# Patient Record
Sex: FEMALE | Race: WHITE | NOT HISPANIC OR LATINO | URBAN - METROPOLITAN AREA
[De-identification: names, ages, dates, MRNs, and addresses within clinical notes are randomized per-mention and may not be internally consistent; named-entity substitution may affect disease eponyms.]

---

## 2022-04-21 ENCOUNTER — OFFICE VISIT (OUTPATIENT)
Dept: URBAN - METROPOLITAN AREA CLINIC 72 | Facility: CLINIC | Age: 67
End: 2022-04-21
Payer: MEDICARE

## 2022-04-21 ENCOUNTER — LAB OUTSIDE AN ENCOUNTER (OUTPATIENT)
Dept: URBAN - METROPOLITAN AREA CLINIC 72 | Facility: CLINIC | Age: 67
End: 2022-04-21

## 2022-04-21 ENCOUNTER — WEB ENCOUNTER (OUTPATIENT)
Dept: URBAN - METROPOLITAN AREA CLINIC 72 | Facility: CLINIC | Age: 67
End: 2022-04-21

## 2022-04-21 VITALS
HEIGHT: 68 IN | WEIGHT: 137.8 LBS | DIASTOLIC BLOOD PRESSURE: 70 MMHG | HEART RATE: 90 BPM | RESPIRATION RATE: 16 BRPM | SYSTOLIC BLOOD PRESSURE: 121 MMHG | BODY MASS INDEX: 20.88 KG/M2 | TEMPERATURE: 98.7 F

## 2022-04-21 DIAGNOSIS — K21.9 GASTROESOPHAGEAL REFLUX DISEASE, UNSPECIFIED WHETHER ESOPHAGITIS PRESENT: ICD-10-CM

## 2022-04-21 DIAGNOSIS — Z86.010 HISTORY OF COLON POLYPS: ICD-10-CM

## 2022-04-21 DIAGNOSIS — K22.70 BARRETT'S ESOPHAGUS WITHOUT DYSPLASIA: ICD-10-CM

## 2022-04-21 PROCEDURE — 99204 OFFICE O/P NEW MOD 45 MIN: CPT | Performed by: INTERNAL MEDICINE

## 2022-04-21 RX ORDER — ATORVASTATIN CALCIUM 40 MG/1
1 TABLET TABLET, FILM COATED ORAL ONCE A DAY
Status: ACTIVE | COMMUNITY

## 2022-04-21 RX ORDER — ESZOPICLONE 3 MG/1
1 TABLET IMMEDIATELY BEFORE BEDTIME TABLET, FILM COATED ORAL ONCE A DAY
Status: ACTIVE | COMMUNITY

## 2022-04-21 RX ORDER — OMEPRAZOLE 20 MG/1
1 CAPSULE 30 MINUTES BEFORE MORNING MEAL CAPSULE, DELAYED RELEASE ORAL ONCE A DAY
Status: ACTIVE | COMMUNITY

## 2022-04-21 RX ORDER — RALOXIFENE HYDROCHLORIDE 60 MG/1
1 TABLET TABLET, FILM COATED ORAL ONCE A DAY
Status: ACTIVE | COMMUNITY

## 2022-04-21 NOTE — HPI-TODAY'S VISIT:
Mrs. Fiore is a pleasant 66-year-old female who presents as a new patient for consultation for GERD and colon cancer screening.  She was referred by Dr. Jean Paul Galo, a copy of this dictation will forwarded to referring physician.Has past medical history of hypertension, hyperlipidemia, osteoporosis and GERD  She brings in records from her last upper endoscopy and colonoscopy, these were in 2013.  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  There is mention of an adenomatous polyp however I cannot find the pathology reports in the records. Plan regardless that she is feeling well and would like to arrange both procedures.  She currently takes omeprazole 20 mg daily for her reflux and if she avoids culprit foods that she has no issues but notes occasionally she will, contact with foods that do cause problems and she will have to take Joy-[place] as needed.

## 2022-06-01 ENCOUNTER — OFFICE VISIT (OUTPATIENT)
Dept: URBAN - METROPOLITAN AREA MEDICAL CENTER 40 | Facility: MEDICAL CENTER | Age: 67
End: 2022-06-01
Payer: MEDICARE

## 2022-06-01 DIAGNOSIS — K22.89 DILATION OF ESOPHAGUS: ICD-10-CM

## 2022-06-01 DIAGNOSIS — K63.5 BENIGN COLON POLYP: ICD-10-CM

## 2022-06-01 DIAGNOSIS — D12.3 ADENOMA OF TRANSVERSE COLON: ICD-10-CM

## 2022-06-01 DIAGNOSIS — K31.89 ACQUIRED DEFORMITY OF DUODENUM: ICD-10-CM

## 2022-06-01 DIAGNOSIS — K22.10 BARRETT'S ESOPHAGEAL ULCERATION: ICD-10-CM

## 2022-06-01 DIAGNOSIS — K21.9 ACID REFLUX: ICD-10-CM

## 2022-06-01 DIAGNOSIS — D12.8 ADENOMATOUS POLYP OF RECTUM: ICD-10-CM

## 2022-06-01 DIAGNOSIS — K57.30 ACQUIRED DIVERTICULOSIS OF COLON: ICD-10-CM

## 2022-06-01 DIAGNOSIS — Z86.010 ADENOMAS PERSONAL HISTORY OF COLONIC POLYPS: ICD-10-CM

## 2022-06-01 PROCEDURE — 43239 EGD BIOPSY SINGLE/MULTIPLE: CPT | Performed by: INTERNAL MEDICINE

## 2022-06-01 PROCEDURE — 45380 COLONOSCOPY AND BIOPSY: CPT | Performed by: INTERNAL MEDICINE

## 2022-06-01 PROCEDURE — 45385 COLONOSCOPY W/LESION REMOVAL: CPT | Performed by: INTERNAL MEDICINE

## 2022-06-15 ENCOUNTER — CLAIMS CREATED FROM THE CLAIM WINDOW (OUTPATIENT)
Dept: URBAN - METROPOLITAN AREA CLINIC 72 | Facility: CLINIC | Age: 67
End: 2022-06-15
Payer: MEDICARE

## 2022-06-15 ENCOUNTER — WEB ENCOUNTER (OUTPATIENT)
Dept: URBAN - METROPOLITAN AREA CLINIC 72 | Facility: CLINIC | Age: 67
End: 2022-06-15

## 2022-06-15 VITALS
RESPIRATION RATE: 16 BRPM | HEART RATE: 69 BPM | BODY MASS INDEX: 20.76 KG/M2 | DIASTOLIC BLOOD PRESSURE: 66 MMHG | TEMPERATURE: 97.9 F | SYSTOLIC BLOOD PRESSURE: 132 MMHG | WEIGHT: 137 LBS | HEIGHT: 68 IN

## 2022-06-15 DIAGNOSIS — K22.70 BARRETT'S ESOPHAGUS WITHOUT DYSPLASIA: ICD-10-CM

## 2022-06-15 DIAGNOSIS — K57.90 DIVERTICULOSIS: ICD-10-CM

## 2022-06-15 DIAGNOSIS — Z86.010 HISTORY OF COLON POLYPS: ICD-10-CM

## 2022-06-15 DIAGNOSIS — K21.9 GASTROESOPHAGEAL REFLUX DISEASE, UNSPECIFIED WHETHER ESOPHAGITIS PRESENT: ICD-10-CM

## 2022-06-15 PROBLEM — 235595009: Status: ACTIVE | Noted: 2022-04-21

## 2022-06-15 PROCEDURE — 99214 OFFICE O/P EST MOD 30 MIN: CPT | Performed by: INTERNAL MEDICINE

## 2022-06-15 RX ORDER — NICOTINE 14MG/24HR
1 CAPSULE PATCH, TRANSDERMAL 24 HOURS TRANSDERMAL TWICE A DAY
Status: ACTIVE | COMMUNITY

## 2022-06-15 RX ORDER — RALOXIFENE HYDROCHLORIDE 60 MG/1
1 TABLET TABLET, FILM COATED ORAL ONCE A DAY
Status: ACTIVE | COMMUNITY

## 2022-06-15 RX ORDER — OMEPRAZOLE 20 MG/1
1 CAPSULE 30 MINUTES BEFORE MORNING MEAL CAPSULE, DELAYED RELEASE ORAL ONCE A DAY
Status: ACTIVE | COMMUNITY

## 2022-06-15 RX ORDER — ESZOPICLONE 3 MG/1
1 TABLET IMMEDIATELY BEFORE BEDTIME TABLET, FILM COATED ORAL ONCE A DAY
Status: ACTIVE | COMMUNITY

## 2022-06-15 RX ORDER — DOXYCYCLINE HYCLATE 100 MG/1
1 TABLET CAPSULE, GELATIN COATED ORAL
Status: ACTIVE | COMMUNITY

## 2022-06-15 RX ORDER — ATORVASTATIN CALCIUM 40 MG/1
1 TABLET TABLET, FILM COATED ORAL ONCE A DAY
Status: ACTIVE | COMMUNITY

## 2022-06-15 NOTE — HPI-TODAY'S VISIT:
Ms. Fiore returns for follow-up.  She is a pleasant 66-year-old last seen in office on 4/21/2022 for evaluation of GERD and need for colon cancer screening.  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  A small hiatal hernia hyperplastic's were mentioned she did have previous adenomatous polyp on her prior colonoscopy.  She is on omeprazole 20 mg daily was having no issues but notes occasionally having to take an Joy-[place] when she came in contact certain foods.  Bidirectional endoscopy was completed 6/1/2022 upper endoscopy was unremarkable aside from a slightly irregular Z-line, colonoscopy completed through the terminal ileum did show left-sided diverticular disease cecal polyp, transverse polyp, ascending colon polyp and 2 rectal polyps.  Pathology returned confirming Briseno's.  2 of the polyps specifically 1 in the transverse colon and one in the rectum were found to be adenomatous and the rest were hyperplastic.  A repeat upper endoscopy was recommended in 3 years and a repeat colonoscopy in 7 years.

## 2022-06-15 NOTE — EXAM-PHYSICAL EXAM
General--no acute distress, normal appearance Eyes--anicteric, no pallor HENT--normocephalic, atraumatic head Neck--no lymphadenopathy, symmetric Chest--non labored, equal chest rise Heart--regular rate Abdomen--soft, non tender, non distended, no organomegaly Skin--no rashes, no jaundice

## 2023-01-30 ENCOUNTER — OFFICE VISIT (OUTPATIENT)
Dept: URBAN - METROPOLITAN AREA CLINIC 72 | Facility: CLINIC | Age: 68
End: 2023-01-30
Payer: MEDICARE

## 2023-01-30 ENCOUNTER — DASHBOARD ENCOUNTERS (OUTPATIENT)
Age: 68
End: 2023-01-30

## 2023-01-30 VITALS
WEIGHT: 139 LBS | TEMPERATURE: 97.5 F | HEIGHT: 68 IN | BODY MASS INDEX: 21.07 KG/M2 | DIASTOLIC BLOOD PRESSURE: 51 MMHG | HEART RATE: 71 BPM | SYSTOLIC BLOOD PRESSURE: 90 MMHG

## 2023-01-30 DIAGNOSIS — K22.70 BARRETT'S ESOPHAGUS WITHOUT DYSPLASIA: ICD-10-CM

## 2023-01-30 DIAGNOSIS — R19.7 DIARRHEA, UNSPECIFIED TYPE: ICD-10-CM

## 2023-01-30 DIAGNOSIS — K57.90 DIVERTICULOSIS: ICD-10-CM

## 2023-01-30 DIAGNOSIS — Z86.010 HISTORY OF COLON POLYPS: ICD-10-CM

## 2023-01-30 PROBLEM — 428283002: Status: ACTIVE | Noted: 2022-04-21

## 2023-01-30 PROBLEM — 302914006: Status: ACTIVE | Noted: 2022-04-21

## 2023-01-30 PROBLEM — 398050005 DIVERTICULAR DISEASE OF COLON: Status: ACTIVE | Noted: 2022-06-15

## 2023-01-30 PROCEDURE — 99214 OFFICE O/P EST MOD 30 MIN: CPT | Performed by: NURSE PRACTITIONER

## 2023-01-30 RX ORDER — DOXYCYCLINE HYCLATE 100 MG/1
1 TABLET CAPSULE, GELATIN COATED ORAL
Status: ON HOLD | COMMUNITY

## 2023-01-30 RX ORDER — CHOLESTYRAMINE 4 G/9G
1 PACKET MIXED WITH WATER OR NON-CARBONATED DRINK POWDER, FOR SUSPENSION ORAL ONCE A DAY
Qty: 30 | Refills: 3 | OUTPATIENT
Start: 2023-01-30

## 2023-01-30 RX ORDER — ESZOPICLONE 3 MG/1
1 TABLET IMMEDIATELY BEFORE BEDTIME TABLET, FILM COATED ORAL ONCE A DAY
Status: ACTIVE | COMMUNITY

## 2023-01-30 RX ORDER — ATORVASTATIN CALCIUM 40 MG/1
1 TABLET TABLET, FILM COATED ORAL ONCE A DAY
Status: ACTIVE | COMMUNITY

## 2023-01-30 RX ORDER — OMEPRAZOLE 20 MG/1
1 CAPSULE 30 MINUTES BEFORE MORNING MEAL CAPSULE, DELAYED RELEASE ORAL ONCE A DAY
Status: ACTIVE | COMMUNITY

## 2023-01-30 RX ORDER — NICOTINE 14MG/24HR
1 CAPSULE PATCH, TRANSDERMAL 24 HOURS TRANSDERMAL TWICE A DAY
Status: ON HOLD | COMMUNITY

## 2023-01-30 RX ORDER — RALOXIFENE HYDROCHLORIDE 60 MG/1
1 TABLET TABLET, FILM COATED ORAL ONCE A DAY
Status: ACTIVE | COMMUNITY

## 2023-01-30 NOTE — HPI-TODAY'S VISIT:
Tanner 67-year-old last seen in office on 4/21/2022 for evaluation of GERD and need for colon cancer screening.  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  A small hiatal hernia hyperplastic's were mentioned she did have previous adenomatous polyp on her prior colonoscopy.  She is on omeprazole 20 mg daily was having no issues but notes occasionally having to take an Joy-[place] when she came in contact certain foods.  Bidirectional endoscopy was completed 6/1/2022 upper endoscopy was unremarkable aside from a slightly irregular Z-line, colonoscopy completed through the terminal ileum did show left-sided diverticular disease cecal polyp, transverse polyp, ascending colon polyp and 2 rectal polyps.  Pathology returned confirming Briseno's.  2 of the polyps specifically 1 in the transverse colon and one in the rectum were found to be adenomatous and the rest were hyperplastic.  A repeat upper endoscopy was recommended in 3 years and a repeat colonoscopy in 7 years.  Last seen in the office 6/15/2022 for EGD and colonoscopy follow-up appointment.  She was advised to continue her omeprazole.  On interview today Pt in office for diarrhea. States diarrhea comes and goes. Pt states this has been going on since August. Pt states she ends up eventually taking Immodium. 2 episodes today.  Urgency, no incontinence.  No nocturnal symptoms. No melena, hematochezia, oily stools or mucous.  Has a little pain, but no significantly pain. No more than 3 episodes, will take imodium if it is more than 3.  She will be fine after taking immodium and not have a BM for 2 days.  She tried cutting out dairy, she has cut out her coffee.  She had some antibiotics in February and August.  On oatmilk.   She tried a pro-biotic but is not on it now.  She does like 1/2 and 1/2 in her coffee.  GERD controlled on low dose PPI.    Dr. Clover galvaned stool tests through [facility] and were negative per patient.

## 2023-01-31 ENCOUNTER — TELEPHONE ENCOUNTER (OUTPATIENT)
Dept: URBAN - METROPOLITAN AREA CLINIC 72 | Facility: CLINIC | Age: 68
End: 2023-01-31

## 2023-02-28 ENCOUNTER — OFFICE VISIT (OUTPATIENT)
Dept: URBAN - METROPOLITAN AREA CLINIC 72 | Facility: CLINIC | Age: 68
End: 2023-02-28